# Patient Record
Sex: FEMALE | Race: WHITE | NOT HISPANIC OR LATINO | URBAN - METROPOLITAN AREA
[De-identification: names, ages, dates, MRNs, and addresses within clinical notes are randomized per-mention and may not be internally consistent; named-entity substitution may affect disease eponyms.]

---

## 2017-01-31 RX ORDER — LEVOTHYROXINE SODIUM 50 UG/1
50 TABLET ORAL DAILY
Qty: 30 TABLET | Refills: 9 | Status: SHIPPED | OUTPATIENT
Start: 2017-01-31 | End: 2017-02-15 | Stop reason: SDUPTHER

## 2017-01-31 NOTE — TELEPHONE ENCOUNTER
----- Message from Catrachita Bello sent at 1/31/2017  8:39 AM CST -----  Contact: Patient Yasmin: [phone]  Patient is calling to get a refill on Synthroid medicine.  Patient stated that she is almost out of this medicine and needs it called in to the pharmacy below.  Patient can be reached at [phone].    Pharmacy used is [facility] and they can be contacted at  [phone].    Please call in.  Thanks

## 2017-02-14 ENCOUNTER — APPOINTMENT (RX ONLY)
Dept: URBAN - METROPOLITAN AREA CLINIC 170 | Facility: CLINIC | Age: 32
Setting detail: DERMATOLOGY
End: 2017-02-14

## 2017-02-14 ENCOUNTER — RX ONLY (OUTPATIENT)
Age: 32
Setting detail: RX ONLY
End: 2017-02-14

## 2017-02-14 DIAGNOSIS — Z41.9 ENCOUNTER FOR PROCEDURE FOR PURPOSES OTHER THAN REMEDYING HEALTH STATE, UNSPECIFIED: ICD-10-CM

## 2017-02-14 PROBLEM — E03.9 HYPOTHYROIDISM, UNSPECIFIED: Status: ACTIVE | Noted: 2017-02-14

## 2017-02-14 PROCEDURE — ? COSMETIC CONSULTATION: FILLERS

## 2017-02-14 PROCEDURE — ? COSMETIC CONSULTATION: BOTULINUM TOXIN

## 2017-02-14 RX ORDER — LIDOCAINE, PRILOCAINE 25; 25 MG/G; MG/G
CREAM TOPICAL
Qty: 1 | Refills: 3 | Status: ERX | COMMUNITY
Start: 2017-02-14

## 2017-02-14 NOTE — HPI: OTHER
Condition:: a cosmetic consult.  This very pleasant patient is interested in exploring treatment options for photoaging changes, dynamic and static rhytides, and facial volume loss.  See \"Cosmetic Consult/Plan\" and photographs in Attachments.
Please Describe Your Condition:: No known contraindications to treatment with botulinum toxin . See \"Cosmetic Procedure\" note in Attachments.

## 2017-02-15 RX ORDER — LEVOTHYROXINE SODIUM 50 UG/1
50 TABLET ORAL DAILY
Qty: 90 TABLET | Refills: 2 | Status: SHIPPED | OUTPATIENT
Start: 2017-02-15 | End: 2017-02-20 | Stop reason: SDUPTHER

## 2017-02-15 NOTE — TELEPHONE ENCOUNTER
----- Message from Jalyn Moore sent at 2/15/2017  9:57 AM CST -----  Contact: Patient  Patient is requesting a call back in regards to a Rx refill for levothyroxine (SYNTHROID) 50 MCG tablet    Please send Rx to [facility] [phone]    Please update pharmacy list    Patient stated that [facility] will be sending over a form that parekh to be returned    Please call patient at [phone]

## 2017-02-20 RX ORDER — LEVOTHYROXINE SODIUM 50 UG/1
50 TABLET ORAL DAILY
Qty: 90 TABLET | Refills: 2 | Status: SHIPPED | OUTPATIENT
Start: 2017-02-20

## 2017-03-07 ENCOUNTER — APPOINTMENT (RX ONLY)
Dept: URBAN - METROPOLITAN AREA CLINIC 170 | Facility: CLINIC | Age: 32
Setting detail: DERMATOLOGY
End: 2017-03-07

## 2017-03-07 DIAGNOSIS — Z029 ENCOUNTERS FOR UNSPECIFIED ADMINISTRATIVE PURPOSE: ICD-10-CM

## 2017-03-07 PROBLEM — Z02.9 ENCOUNTER FOR ADMINISTRATIVE EXAMINATIONS, UNSPECIFIED: Status: ACTIVE | Noted: 2017-03-07

## 2017-03-07 PROCEDURE — ? OTHER

## 2017-03-07 NOTE — PROCEDURE: OTHER
Other (Free Text): Nice response; care reviewed
Detail Level: Zone
Note Text (......Xxx Chief Complaint.): This diagnosis correlates with the

## 2017-03-07 NOTE — HPI: OTHER
Condition:: Cosmetic f/u - 2/14/17 btx-a tx
Please Describe Your Condition:: See \"Cosmetic Procedure\" note in Attachments.

## 2021-07-26 ENCOUNTER — TELEPHONE (OUTPATIENT)
Dept: PULMONOLOGY | Facility: CLINIC | Age: 36
End: 2021-07-26